# Patient Record
Sex: FEMALE | Race: WHITE | NOT HISPANIC OR LATINO | URBAN - METROPOLITAN AREA
[De-identification: names, ages, dates, MRNs, and addresses within clinical notes are randomized per-mention and may not be internally consistent; named-entity substitution may affect disease eponyms.]

---

## 2023-02-27 ENCOUNTER — OFFICE (OUTPATIENT)
Dept: URBAN - METROPOLITAN AREA CLINIC 14 | Facility: CLINIC | Age: 68
End: 2023-02-27
Payer: COMMERCIAL

## 2023-02-27 VITALS
HEART RATE: 76 BPM | OXYGEN SATURATION: 93 % | WEIGHT: 178 LBS | DIASTOLIC BLOOD PRESSURE: 79 MMHG | HEIGHT: 69 IN | SYSTOLIC BLOOD PRESSURE: 122 MMHG | RESPIRATION RATE: 18 BRPM

## 2023-02-27 DIAGNOSIS — K74.60 UNSPECIFIED CIRRHOSIS OF LIVER: ICD-10-CM

## 2023-02-27 PROCEDURE — 99203 OFFICE O/P NEW LOW 30 MIN: CPT | Performed by: INTERNAL MEDICINE

## 2023-02-27 NOTE — SERVICEHPINOTES
Torrie Wiggins   is a   67   year old  female   here today  For consultation due to cirrhosis.  She was also wondering with her she would qualify for a clinical trial.  She states that she has been told to have cirrhosis about 5 years ago and remembers having a liver biopsy done at the time of her cholecystectomy.  She believes this was in the [facility].  She has been followed by Dr. Ruben Chow.  She does get ultrasounds regularly and believed to most recent 1 was about 6 months ago maybe last.  She also has had an endoscopy that showed varices and was placed on nadolol.  I did see some labs from 3 months ago which showed preserved liver function.  She does have go pancytopenia and her most recent platelet count was 66. I did not see a recent alpha-fetoprotein results.  She is up-to-date on her colonoscopies with Dr. Chow.

## 2023-02-28 LAB
AFP, SERUM, TUMOR MARKER: 4.9 NG/ML (ref 0–9.2)
CBC, PLATELET, NO DIFFERENTIAL: HEMATOCRIT: 43.9 % (ref 34–46.6)
CBC, PLATELET, NO DIFFERENTIAL: HEMATOLOGY COMMENTS: (no result)
CBC, PLATELET, NO DIFFERENTIAL: HEMOGLOBIN: 14.7 G/DL (ref 11.1–15.9)
CBC, PLATELET, NO DIFFERENTIAL: MCH: 29.1 PG (ref 26.6–33)
CBC, PLATELET, NO DIFFERENTIAL: MCHC: 33.5 G/DL (ref 31.5–35.7)
CBC, PLATELET, NO DIFFERENTIAL: MCV: 87 FL (ref 79–97)
CBC, PLATELET, NO DIFFERENTIAL: PLATELETS: 79 X10E3/UL — CRITICAL LOW (ref 150–450)
CBC, PLATELET, NO DIFFERENTIAL: RBC: 5.06 X10E6/UL (ref 3.77–5.28)
CBC, PLATELET, NO DIFFERENTIAL: RDW: 12.9 % (ref 11.7–15.4)
CBC, PLATELET, NO DIFFERENTIAL: WBC: 4 X10E3/UL (ref 3.4–10.8)

## 2023-07-05 ENCOUNTER — OFFICE (OUTPATIENT)
Dept: URBAN - METROPOLITAN AREA CLINIC 19 | Facility: CLINIC | Age: 68
End: 2023-07-05

## 2023-07-05 PROCEDURE — STUDY: HCPCS | Mod: TC | Performed by: INTERNAL MEDICINE

## 2023-10-09 ENCOUNTER — OFFICE (OUTPATIENT)
Dept: URBAN - METROPOLITAN AREA CLINIC 14 | Facility: CLINIC | Age: 68
End: 2023-10-09
Payer: COMMERCIAL

## 2023-10-09 VITALS
SYSTOLIC BLOOD PRESSURE: 119 MMHG | OXYGEN SATURATION: 98 % | HEIGHT: 69 IN | RESPIRATION RATE: 18 BRPM | WEIGHT: 178 LBS | HEART RATE: 59 BPM | DIASTOLIC BLOOD PRESSURE: 79 MMHG

## 2023-10-09 DIAGNOSIS — K74.60 UNSPECIFIED CIRRHOSIS OF LIVER: ICD-10-CM

## 2023-10-09 PROCEDURE — 99213 OFFICE O/P EST LOW 20 MIN: CPT | Performed by: INTERNAL MEDICINE

## 2023-10-10 LAB
AFP, SERUM, TUMOR MARKER: 5.8 NG/ML (ref 0–9.2)
CBC, PLATELET, NO DIFFERENTIAL: HEMATOCRIT: 44 % (ref 34–46.6)
CBC, PLATELET, NO DIFFERENTIAL: HEMATOLOGY COMMENTS: (no result)
CBC, PLATELET, NO DIFFERENTIAL: HEMOGLOBIN: 15.4 G/DL (ref 11.1–15.9)
CBC, PLATELET, NO DIFFERENTIAL: MCH: 30.1 PG (ref 26.6–33)
CBC, PLATELET, NO DIFFERENTIAL: MCHC: 35 G/DL (ref 31.5–35.7)
CBC, PLATELET, NO DIFFERENTIAL: MCV: 86 FL (ref 79–97)
CBC, PLATELET, NO DIFFERENTIAL: PLATELETS: 79 X10E3/UL — CRITICAL LOW (ref 150–450)
CBC, PLATELET, NO DIFFERENTIAL: RBC: 5.12 X10E6/UL (ref 3.77–5.28)
CBC, PLATELET, NO DIFFERENTIAL: RDW: 13.3 % (ref 11.7–15.4)
CBC, PLATELET, NO DIFFERENTIAL: WBC: 4.4 X10E3/UL (ref 3.4–10.8)
COMP. METABOLIC PANEL (14): A/G RATIO: 1.9 (ref 1.2–2.2)
COMP. METABOLIC PANEL (14): ALBUMIN: 4.9 G/DL (ref 3.9–4.9)
COMP. METABOLIC PANEL (14): ALKALINE PHOSPHATASE: 199 IU/L — HIGH (ref 44–121)
COMP. METABOLIC PANEL (14): ALT (SGPT): 31 IU/L (ref 0–32)
COMP. METABOLIC PANEL (14): AST (SGOT): 35 IU/L (ref 0–40)
COMP. METABOLIC PANEL (14): BILIRUBIN, TOTAL: 1.2 MG/DL (ref 0–1.2)
COMP. METABOLIC PANEL (14): BUN/CREATININE RATIO: 18 (ref 12–28)
COMP. METABOLIC PANEL (14): BUN: 14 MG/DL (ref 8–27)
COMP. METABOLIC PANEL (14): CALCIUM: 10.1 MG/DL (ref 8.7–10.3)
COMP. METABOLIC PANEL (14): CARBON DIOXIDE, TOTAL: 24 MMOL/L (ref 20–29)
COMP. METABOLIC PANEL (14): CHLORIDE: 102 MMOL/L (ref 96–106)
COMP. METABOLIC PANEL (14): CREATININE: 0.77 MG/DL (ref 0.57–1)
COMP. METABOLIC PANEL (14): EGFR: 84 ML/MIN/1.73 (ref 59–?)
COMP. METABOLIC PANEL (14): GLOBULIN, TOTAL: 2.6 G/DL (ref 1.5–4.5)
COMP. METABOLIC PANEL (14): GLUCOSE: 187 MG/DL — HIGH (ref 70–99)
COMP. METABOLIC PANEL (14): POTASSIUM: 4.1 MMOL/L (ref 3.5–5.2)
COMP. METABOLIC PANEL (14): PROTEIN, TOTAL: 7.5 G/DL (ref 6–8.5)
COMP. METABOLIC PANEL (14): SODIUM: 143 MMOL/L (ref 134–144)
PROTHROMBIN TIME (PT): INR: 1.1 (ref 0.9–1.2)
PROTHROMBIN TIME (PT): PROTHROMBIN TIME: 11.7 SEC (ref 9.1–12)

## 2024-01-08 ENCOUNTER — OFFICE (OUTPATIENT)
Dept: URBAN - METROPOLITAN AREA CLINIC 11 | Facility: CLINIC | Age: 69
End: 2024-01-08

## 2024-01-08 VITALS
SYSTOLIC BLOOD PRESSURE: 143 MMHG | HEIGHT: 69 IN | WEIGHT: 184 LBS | DIASTOLIC BLOOD PRESSURE: 80 MMHG | HEART RATE: 75 BPM | OXYGEN SATURATION: 97 %

## 2024-01-08 DIAGNOSIS — K74.60 UNSPECIFIED CIRRHOSIS OF LIVER: ICD-10-CM

## 2024-01-08 PROCEDURE — 99213 OFFICE O/P EST LOW 20 MIN: CPT | Performed by: INTERNAL MEDICINE
